# Patient Record
Sex: FEMALE
[De-identification: names, ages, dates, MRNs, and addresses within clinical notes are randomized per-mention and may not be internally consistent; named-entity substitution may affect disease eponyms.]

---

## 2021-05-27 ENCOUNTER — HOSPITAL ENCOUNTER (OUTPATIENT)
Dept: HOSPITAL 8 - CFH | Age: 41
Discharge: HOME | End: 2021-05-27
Attending: FAMILY MEDICINE
Payer: MEDICAID

## 2021-05-27 DIAGNOSIS — N83.202: Primary | ICD-10-CM

## 2021-05-27 PROCEDURE — 74177 CT ABD & PELVIS W/CONTRAST: CPT

## 2021-07-29 ENCOUNTER — HOSPITAL ENCOUNTER (EMERGENCY)
Dept: HOSPITAL 8 - ED | Age: 41
Discharge: HOME | End: 2021-07-29
Payer: MEDICAID

## 2021-07-29 VITALS — BODY MASS INDEX: 33.24 KG/M2 | HEIGHT: 65 IN | WEIGHT: 199.52 LBS

## 2021-07-29 VITALS — DIASTOLIC BLOOD PRESSURE: 79 MMHG | SYSTOLIC BLOOD PRESSURE: 123 MMHG

## 2021-07-29 DIAGNOSIS — F17.210: ICD-10-CM

## 2021-07-29 DIAGNOSIS — R06.02: ICD-10-CM

## 2021-07-29 DIAGNOSIS — J45.909: Primary | ICD-10-CM

## 2021-07-29 DIAGNOSIS — Z20.822: ICD-10-CM

## 2021-07-29 PROCEDURE — U0003 INFECTIOUS AGENT DETECTION BY NUCLEIC ACID (DNA OR RNA); SEVERE ACUTE RESPIRATORY SYNDROME CORONAVIRUS 2 (SARS-COV-2) (CORONAVIRUS DISEASE [COVID-19]), AMPLIFIED PROBE TECHNIQUE, MAKING USE OF HIGH THROUGHPUT TECHNOLOGIES AS DESCRIBED BY CMS-2020-01-R: HCPCS

## 2021-07-29 PROCEDURE — 71045 X-RAY EXAM CHEST 1 VIEW: CPT

## 2021-07-29 PROCEDURE — 99406 BEHAV CHNG SMOKING 3-10 MIN: CPT

## 2021-07-29 PROCEDURE — 99284 EMERGENCY DEPT VISIT MOD MDM: CPT

## 2021-07-29 PROCEDURE — 94640 AIRWAY INHALATION TREATMENT: CPT

## 2021-07-29 NOTE — NUR
PT RESTING ON SIDE IN BED, NAD NOTED AT THIS TIME. RESPIRATIONS EVEN AND 
UNLABORED ON RA. AWAITING RECHECK.

## 2021-09-25 ENCOUNTER — HOSPITAL ENCOUNTER (EMERGENCY)
Dept: HOSPITAL 8 - ED | Age: 41
LOS: 1 days | Discharge: HOME | End: 2021-09-26
Payer: MEDICAID

## 2021-09-25 VITALS — BODY MASS INDEX: 33.2 KG/M2 | WEIGHT: 199.3 LBS | HEIGHT: 65 IN
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
## 2021-09-25 DIAGNOSIS — S93.491A: Primary | ICD-10-CM
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
## 2021-09-25 PROCEDURE — 99283 EMERGENCY DEPT VISIT LOW MDM: CPT

## 2021-09-26 VITALS — DIASTOLIC BLOOD PRESSURE: 72 MMHG | SYSTOLIC BLOOD PRESSURE: 135 MMHG

## 2021-09-26 NOTE — NUR
Patient given discharge instructions and they have confirmed that they 
understand the instructions.  NAD, all questions answered appropriately, denies 
additional needs at this time. No personal belongings left in room after 
discharge. Pt states she has crutches at home.